# Patient Record
Sex: MALE | Race: WHITE | NOT HISPANIC OR LATINO
[De-identification: names, ages, dates, MRNs, and addresses within clinical notes are randomized per-mention and may not be internally consistent; named-entity substitution may affect disease eponyms.]

---

## 2018-07-26 ENCOUNTER — FORM ENCOUNTER (OUTPATIENT)
Age: 68
End: 2018-07-26

## 2019-04-12 ENCOUNTER — FORM ENCOUNTER (OUTPATIENT)
Age: 69
End: 2019-04-12

## 2022-08-04 DIAGNOSIS — I10 ESSENTIAL (PRIMARY) HYPERTENSION: ICD-10-CM

## 2022-08-04 DIAGNOSIS — B35.1 TINEA UNGUIUM: ICD-10-CM

## 2022-08-04 DIAGNOSIS — Z82.61 FAMILY HISTORY OF ARTHRITIS: ICD-10-CM

## 2022-08-04 DIAGNOSIS — E78.5 HYPERLIPIDEMIA, UNSPECIFIED: ICD-10-CM

## 2022-08-04 DIAGNOSIS — Z82.49 FAMILY HISTORY OF ISCHEMIC HEART DISEASE AND OTHER DISEASES OF THE CIRCULATORY SYSTEM: ICD-10-CM

## 2022-08-04 DIAGNOSIS — Z98.890 OTHER SPECIFIED POSTPROCEDURAL STATES: ICD-10-CM

## 2022-08-04 DIAGNOSIS — Z87.891 PERSONAL HISTORY OF NICOTINE DEPENDENCE: ICD-10-CM

## 2022-08-04 DIAGNOSIS — L60.2 ONYCHOGRYPHOSIS: ICD-10-CM

## 2022-08-04 DIAGNOSIS — Z86.39 PERSONAL HISTORY OF OTHER ENDOCRINE, NUTRITIONAL AND METABOLIC DISEASE: ICD-10-CM

## 2022-08-04 PROBLEM — Z00.00 ENCOUNTER FOR PREVENTIVE HEALTH EXAMINATION: Status: ACTIVE | Noted: 2022-08-04

## 2022-08-04 RX ORDER — SIMVASTATIN 20 MG/1
20 TABLET, FILM COATED ORAL
Refills: 0 | Status: ACTIVE | COMMUNITY

## 2022-08-04 RX ORDER — AMLODIPINE BESYLATE 5 MG/1
5 TABLET ORAL
Refills: 0 | Status: ACTIVE | COMMUNITY

## 2022-08-04 RX ORDER — FUROSEMIDE 40 MG/1
40 TABLET ORAL
Refills: 0 | Status: ACTIVE | COMMUNITY

## 2022-08-04 RX ORDER — CARVEDILOL 6.25 MG/1
6.25 TABLET, FILM COATED ORAL
Refills: 0 | Status: ACTIVE | COMMUNITY

## 2022-08-04 RX ORDER — METFORMIN HYDROCHLORIDE 1000 MG/1
1000 TABLET, COATED ORAL
Refills: 0 | Status: ACTIVE | COMMUNITY

## 2022-08-22 ENCOUNTER — APPOINTMENT (OUTPATIENT)
Dept: PODIATRY | Facility: CLINIC | Age: 72
End: 2022-08-22

## 2022-08-26 ENCOUNTER — APPOINTMENT (OUTPATIENT)
Dept: PODIATRY | Facility: CLINIC | Age: 72
End: 2022-08-26

## 2022-08-26 PROCEDURE — 11055 PARING/CUTG B9 HYPRKER LES 1: CPT

## 2022-08-26 PROCEDURE — 11721 DEBRIDE NAIL 6 OR MORE: CPT | Mod: 59

## 2022-09-02 NOTE — HISTORY OF PRESENT ILLNESS
[Sneakers] : fidelina [FreeTextEntry1] : Patient presents today for at-risk foot care.  He has painful, thickened nails that he cannot cut.  He also has bilateral, medial and lateral ingrowning sides of his halluces.  He has painful hyperkeratotic lesion, plantar medial side of the hallux as well.  Patient is wide Skechers with adequate padding.  \par Patient is diabetic.  He follows with Dr. Alvarado, who he last saw June 2022.  Last A1c: 7%.  Finger stick: not checked this morning.  Patient denies any tingling, burning or numbness to the lower extremities.  \par

## 2022-09-02 NOTE — PHYSICAL EXAM
[Ankle Swelling (On Exam)] : present [Ankle Swelling Bilaterally] : bilaterally  [Ankle Swelling On The Left] : moderate [0] : left foot posterior tibialis 0 [1+] : left foot dorsalis pedis 1+ [Vibration Dec.] : diminished vibratory sensation at the level of the toes [Position Sense Dec.] : diminished position sense at the level of the toes [Diminished Throughout Right Foot] : diminished sensation with monofilament testing throughout right foot [Diminished Throughout Left Foot] : diminished sensation with monofilament testing throughout left foot [FreeTextEntry3] : CFT: Immediate.  Temperature gradient: normal.  Patient has fluid retention.   [de-identified] : Flexible hammertoes 2 through 5, bilateral.  All pedal joints ROM: normal.  Muscle strength: normal to all pedal muscles, bilateral.   [FreeTextEntry1] : Q9-The patient has a class finding of Q9-One Class B and 2 Class C findings

## 2022-09-02 NOTE — PROCEDURE
[FreeTextEntry1] : Impression: Pain, right foot, pain left foot (M79).  Diabetes with neuropathy (E11.42).  Ingrown toenails (L60) Nail dystrophy (L60.3) \par \par Treatment: Educated patient on the importance of glycemic control and diabetic foot care.  \par Toenails 1 through 5 bilaterally were debrided in thickness and in length with sterile nippers and rotary bur to patients’ satisfaction. Patient tolerated the procedure well.  Slant backs were performed on both medial and lateral and distal halluces to relieve the ingrown toenails.  Checked for spicules, none were found.  \par Medial callus on the hallux was trimmed with sterile #23 blade to patients’ satisfaction. Patient tolerated the procedure well. \par Return: 64 days.

## 2022-10-31 ENCOUNTER — APPOINTMENT (OUTPATIENT)
Dept: PODIATRY | Facility: CLINIC | Age: 72
End: 2022-10-31

## 2022-10-31 VITALS — HEIGHT: 69 IN | WEIGHT: 200 LBS | BODY MASS INDEX: 29.62 KG/M2

## 2022-10-31 PROCEDURE — 11721 DEBRIDE NAIL 6 OR MORE: CPT | Mod: 59

## 2022-10-31 PROCEDURE — 11055 PARING/CUTG B9 HYPRKER LES 1: CPT

## 2022-11-07 NOTE — HISTORY OF PRESENT ILLNESS
[Sneakers] : fidelina [FreeTextEntry1] : Patient presents today for at-risk foot care.  He has painful, thickened nails that he cannot cut.  He also has IPK's.  Patient is diabetic.  A1c: 6.7%.  He did not check his finger stick this morning.  He follows with Dr. Alvarado, who he last saw a couple of weeks ago.  There are no changes to his medical conditions.  \par

## 2022-11-07 NOTE — ASSESSMENT
[FreeTextEntry1] : Impression: Diabetes with neuropathy (E11.42). Nail dystrophy (L60.3).  IPK, right hallux (L85.1).  \par \par Treatment: Educated patient on the importance of glycemic control and diabetic foot care.  \par Toenails 1 through 5 bilaterally were debrided in thickness and in length with sterile nippers and rotary bur to patients’ satisfaction. Patient tolerated the procedure well.  Slant backs were performed on both medial and lateral and distal halluces to relieve the ingrown toenails.  Checked for spicules, none were found.  \par Medial callus on the hallux was trimmed with sterile #23 blade to patients’ satisfaction. Patient tolerated the procedure well. \par Return: 64 days.

## 2022-11-07 NOTE — PHYSICAL EXAM
[Ankle Swelling (On Exam)] : present [Ankle Swelling Bilaterally] : bilaterally  [Ankle Swelling On The Left] : moderate [0] : left foot posterior tibialis 0 [1+] : left foot dorsalis pedis 1+ [Vibration Dec.] : diminished vibratory sensation at the level of the toes [Position Sense Dec.] : diminished position sense at the level of the toes [Diminished Throughout Right Foot] : diminished sensation with monofilament testing throughout right foot [Diminished Throughout Left Foot] : diminished sensation with monofilament testing throughout left foot [FreeTextEntry3] : CFT: Immediate.  Temperature gradient: normal.  Patient has fluid retention.   [de-identified] : Flexible hammertoes 2 through 5, bilateral.  All pedal joints ROM: normal.  Muscle strength: normal to all pedal muscles, bilateral.   [FreeTextEntry1] : Q9-The patient has a class finding of Q9-One Class B and 2 Class C findings

## 2023-01-10 ENCOUNTER — APPOINTMENT (OUTPATIENT)
Dept: PODIATRY | Facility: CLINIC | Age: 73
End: 2023-01-10
Payer: MEDICARE

## 2023-01-10 PROCEDURE — 11721 DEBRIDE NAIL 6 OR MORE: CPT | Mod: 59

## 2023-01-10 PROCEDURE — 11055 PARING/CUTG B9 HYPRKER LES 1: CPT

## 2023-01-23 NOTE — PHYSICAL EXAM
[Ankle Swelling (On Exam)] : present [Ankle Swelling Bilaterally] : bilaterally  [Ankle Swelling On The Left] : moderate [0] : left foot posterior tibialis 0 [1+] : left foot dorsalis pedis 1+ [Vibration Dec.] : diminished vibratory sensation at the level of the toes [Position Sense Dec.] : diminished position sense at the level of the toes [Diminished Throughout Right Foot] : diminished sensation with monofilament testing throughout right foot [Diminished Throughout Left Foot] : diminished sensation with monofilament testing throughout left foot [FreeTextEntry3] : CFT: Immediate. Temperature gradient: normal. Patient has fluid retention.  [de-identified] : Flexible hammertoes 2 through 5, bilateral. All pedal joints ROM: normal. Muscle strength: normal to all pedal muscles, bilateral. \par  [FreeTextEntry1] : The patient has a class finding of Q9-One Class B and 2 Class C findings.

## 2023-01-23 NOTE — HISTORY OF PRESENT ILLNESS
[Sneakers] : fidelina [FreeTextEntry1] : Patient returns today for management of painful onychomycotic ingrown toenails and right foot IPK. Patient is diabetic. Finger stick today is 112. A1c is 6.7. He last saw Dr. Alvarado 3 months ago.\par

## 2023-01-23 NOTE — ASSESSMENT
[FreeTextEntry1] : \par Impression: Diabetes with neuropathy. Nail dystrophy. IPK, right hallux.\par \par Treatment: Discussed importance of glycemic control and diabetic foot care.  Patient is wearing adequately fitting sneakers today. Patient's nails 1 through 5 bilaterally of excessive thickness and length to appropriate levels of comfort and contour using sterile nippers and Dremel.   The procedure was tolerated well without complications.  \par Failure to perform this treatment based on patient's underlying condition could lead to ulceration and infection. Slant backs were performed on both medial and lateral and distal halluces to relieve the ingrown toenails. Checked for spicules, none were found.  Right foot IPK was trimmed and reduced uneventfully with sterile #23 blade.  I will see him back in 64 days.\par \par \par

## 2023-03-21 ENCOUNTER — APPOINTMENT (OUTPATIENT)
Dept: PODIATRY | Facility: CLINIC | Age: 73
End: 2023-03-21
Payer: MEDICARE

## 2023-03-21 DIAGNOSIS — E53.8 DEFICIENCY OF OTHER SPECIFIED B GROUP VITAMINS: ICD-10-CM

## 2023-03-21 PROCEDURE — 11721 DEBRIDE NAIL 6 OR MORE: CPT | Mod: 59

## 2023-03-21 PROCEDURE — 11055 PARING/CUTG B9 HYPRKER LES 1: CPT

## 2023-03-24 PROBLEM — E53.8 B12 DEFICIENCY: Status: ACTIVE | Noted: 2023-03-21

## 2023-03-24 PROBLEM — E53.8 B12 DEFICIENCY: Status: RESOLVED | Noted: 2023-03-22 | Resolved: 2023-03-24

## 2023-03-24 RX ORDER — DULAGLUTIDE 0.75 MG/.5ML
0.75 INJECTION, SOLUTION SUBCUTANEOUS
Qty: 2 | Refills: 0 | Status: ACTIVE | COMMUNITY
Start: 2023-02-27

## 2023-03-24 RX ORDER — ROSUVASTATIN CALCIUM 20 MG/1
20 TABLET, FILM COATED ORAL
Qty: 90 | Refills: 0 | Status: ACTIVE | COMMUNITY
Start: 2022-04-21

## 2023-03-24 RX ORDER — FINASTERIDE 5 MG/1
5 TABLET, FILM COATED ORAL
Qty: 90 | Refills: 0 | Status: ACTIVE | COMMUNITY
Start: 2022-05-09

## 2023-03-24 RX ORDER — CARVEDILOL 25 MG/1
25 TABLET, FILM COATED ORAL
Qty: 180 | Refills: 0 | Status: ACTIVE | COMMUNITY
Start: 2022-06-20

## 2023-03-24 RX ORDER — COVID-19 ANTIGEN TEST
KIT MISCELLANEOUS
Qty: 8 | Refills: 0 | Status: ACTIVE | COMMUNITY
Start: 2023-02-19

## 2023-03-24 RX ORDER — MECOBALAMIN 5000 MCG
LOZENGE ORAL
Refills: 0 | Status: ACTIVE | COMMUNITY

## 2023-03-24 NOTE — HISTORY OF PRESENT ILLNESS
[Sneakers] : fidelina [FreeTextEntry1] : Patient returns today for management of painful thickened toenails with ingrown hallux nails as well as painful IPK.  Patient is diabetic but did not check his finger stick this morning.  His last A1c was 6.7%.  His last primary care doctor appointment was one month ago.  Patient was diagnosed with B-12 deficiency and has been going for B12 shots by his primary care doctor.

## 2023-03-24 NOTE — ASSESSMENT
[FreeTextEntry1] : \par Impression: Diabetes with neuropathy (E11.42).  Nail dystrophy (L60.3). IPK, right hallux (L85.1).\par \par Treatment: Discussed importance of glycemic control and diabetic foot care.  Patient is wearing adequately fitting sneakers today. \par Nails 1 through 5 bilaterally were debrided of excessive thickness and length to appropriate levels of comfort and contour using sterile nippers and Dremel.   The procedure was tolerated well without complications.\par The IPK was  trimmed and reduced uneventfully with sterile #23 blade.  \par Failure to perform this treatment based on patient's underlying condition could lead to ulceration and infection.\par Return: 64 days

## 2023-03-24 NOTE — PHYSICAL EXAM
[Ankle Swelling (On Exam)] : present [Ankle Swelling Bilaterally] : bilaterally  [Ankle Swelling On The Left] : moderate [0] : left foot posterior tibialis 0 [1+] : left foot dorsalis pedis 1+ [Vibration Dec.] : diminished vibratory sensation at the level of the toes [Position Sense Dec.] : diminished position sense at the level of the toes [Diminished Throughout Right Foot] : diminished sensation with monofilament testing throughout right foot [Diminished Throughout Left Foot] : diminished sensation with monofilament testing throughout left foot [FreeTextEntry3] : CFT: 3 seconds x 10.  Temperature gradient: normal.  Patient has some fluid retention.   [de-identified] : Flexible hammertoes 2 through 5, bilateral.  All pedal joints ROM: normal.  Muscle strength: normal to all pedal muscles, bilateral. \par  [FreeTextEntry1] : Q9-The patient has a class finding of Q9-One Class B and 2 Class C findings

## 2023-06-05 ENCOUNTER — APPOINTMENT (OUTPATIENT)
Dept: PODIATRY | Facility: CLINIC | Age: 73
End: 2023-06-05
Payer: MEDICARE

## 2023-06-05 PROCEDURE — 11056 PARNG/CUTG B9 HYPRKR LES 2-4: CPT

## 2023-06-05 PROCEDURE — 11721 DEBRIDE NAIL 6 OR MORE: CPT | Mod: 59

## 2023-06-07 NOTE — HISTORY OF PRESENT ILLNESS
[FreeTextEntry1] : Patient returns today for management of painful elongated toenails that become ingrown.  Patient is diabetic.  He did not check his finger stick today.  His last A1c was 6.4%.  His last PCP appointment was 2 months ago.

## 2023-06-07 NOTE — PHYSICAL EXAM
[Ankle Swelling (On Exam)] : present [Ankle Swelling Bilaterally] : bilaterally  [Ankle Swelling On The Left] : moderate [0] : left foot posterior tibialis 0 [1+] : left foot dorsalis pedis 1+ [Vibration Dec.] : diminished vibratory sensation at the level of the toes [Position Sense Dec.] : diminished position sense at the level of the toes [Diminished Throughout Right Foot] : diminished sensation with monofilament testing throughout right foot [Diminished Throughout Left Foot] : diminished sensation with monofilament testing throughout left foot [FreeTextEntry3] : CFT: 3 seconds x 10.  Temperature gradient: normal.  Patient has some fluid retention.   [de-identified] : Flexible hammertoes 2 through 5, bilateral.  All pedal joints ROM: normal.  Muscle strength: normal to all pedal muscles, bilateral. \par  [FreeTextEntry1] : Q9-The patient has a class finding of Q9-One Class B and 2 Class C findings

## 2023-06-07 NOTE — ASSESSMENT
[FreeTextEntry1] : Impression: Diabetes with neuropathy (E11.42).  Nail dystrophy (L60.3). IPK, right hallux (L85.1).\par \par Treatment: Discussed importance of glycemic control and diabetic foot care.  Patient is wearing adequately fitting sneakers today. \par Nails 1 through 5 bilaterally were debrided of excessive thickness and length to appropriate levels of comfort and contour using sterile nippers and Dremel.   The procedure was tolerated well without complications.\par The IPK was  trimmed and reduced uneventfully with sterile #23 blade.  \par Failure to perform this treatment based on patient's underlying condition could lead to ulceration and infection.\par Return: 64 days

## 2023-08-14 ENCOUNTER — APPOINTMENT (OUTPATIENT)
Dept: PODIATRY | Facility: CLINIC | Age: 73
End: 2023-08-14
Payer: MEDICARE

## 2023-08-14 DIAGNOSIS — E11.40 TYPE 2 DIABETES MELLITUS WITH DIABETIC NEUROPATHY, UNSPECIFIED: ICD-10-CM

## 2023-08-14 DIAGNOSIS — L60.0 INGROWING NAIL: ICD-10-CM

## 2023-08-14 PROCEDURE — 11056 PARNG/CUTG B9 HYPRKR LES 2-4: CPT

## 2023-08-14 PROCEDURE — 11721 DEBRIDE NAIL 6 OR MORE: CPT | Mod: 59

## 2023-08-15 PROBLEM — E11.40 DIABETES MELLITUS WITH NEUROPATHY: Status: ACTIVE | Noted: 2022-08-04

## 2023-08-21 PROBLEM — L60.0 INGROWN NAIL: Status: ACTIVE | Noted: 2023-08-15

## 2023-08-21 NOTE — HISTORY OF PRESENT ILLNESS
[FreeTextEntry1] : Patient returns today for management of painful, thickened, elongated toenails that become ingrown and hyperkeratotic skin lesions.  Patient is diabetic.  He did not check his finger stick today.  His last A1c was 6.6%.  His last PCP appointment was in July and reports no new medical changes.

## 2023-08-21 NOTE — PHYSICAL EXAM
[Ankle Swelling (On Exam)] : present [Ankle Swelling Bilaterally] : bilaterally  [Ankle Swelling On The Left] : moderate [0] : left foot posterior tibialis 0 [1+] : left foot dorsalis pedis 1+ [Vibration Dec.] : diminished vibratory sensation at the level of the toes [Position Sense Dec.] : diminished position sense at the level of the toes [Diminished Throughout Right Foot] : diminished sensation with monofilament testing throughout right foot [Diminished Throughout Left Foot] : diminished sensation with monofilament testing throughout left foot [FreeTextEntry3] : CFT: 3 seconds x 10.  Temperature gradient: normal.  Patient has some fluid retention.   [de-identified] : Flexible hammertoes 2 through 5, bilateral.  All pedal joints ROM: normal.  Muscle strength: normal to all pedal muscles, bilateral. \par   [FreeTextEntry1] : Q9-The patient has a class finding of Q9-One Class B and 2 Class C findings

## 2023-10-24 ENCOUNTER — APPOINTMENT (OUTPATIENT)
Dept: PODIATRY | Facility: CLINIC | Age: 73
End: 2023-10-24
Payer: MEDICARE

## 2023-10-24 PROCEDURE — 11721 DEBRIDE NAIL 6 OR MORE: CPT | Mod: 59

## 2023-10-24 PROCEDURE — 11056 PARNG/CUTG B9 HYPRKR LES 2-4: CPT

## 2024-01-02 ENCOUNTER — APPOINTMENT (OUTPATIENT)
Dept: PODIATRY | Facility: CLINIC | Age: 74
End: 2024-01-02
Payer: MEDICARE

## 2024-01-02 PROCEDURE — 11056 PARNG/CUTG B9 HYPRKR LES 2-4: CPT

## 2024-01-02 PROCEDURE — 11721 DEBRIDE NAIL 6 OR MORE: CPT | Mod: 59

## 2024-03-18 ENCOUNTER — APPOINTMENT (OUTPATIENT)
Dept: PODIATRY | Facility: CLINIC | Age: 74
End: 2024-03-18
Payer: MEDICARE

## 2024-03-18 DIAGNOSIS — L85.1 ACQUIRED KERATOSIS [KERATODERMA] PALMARIS ET PLANTARIS: ICD-10-CM

## 2024-03-18 PROCEDURE — 11719 TRIM NAIL(S) ANY NUMBER: CPT | Mod: Q9

## 2024-03-18 PROCEDURE — 11056 PARNG/CUTG B9 HYPRKR LES 2-4: CPT | Mod: Q9

## 2024-03-19 PROBLEM — L85.1 PLANTAR KERATOSIS, ACQUIRED: Status: ACTIVE | Noted: 2022-08-04

## 2024-03-20 NOTE — ASSESSMENT
[FreeTextEntry1] : Impression: Keratosis. Dystrophy of the nails. Diabetic neuropathy. Pain.  Treatment: I prepped the area with alcohol and trimmed keratotic lesions x2 without incident. I debrided dystrophic and ingrown nails without incident. I put Neosporin dressing on. Patient will follow-up in the office with continued problems that persist. Patient was given home care instructions and education on care of the diabetic foot.

## 2024-03-20 NOTE — HISTORY OF PRESENT ILLNESS
[FreeTextEntry1] : Patient presents today for high-risk foot care. His A1c is 6.6. Fasting sugar is 114. He has dystrophic and ingrown nails as well as worsening keratosis. He complains of pain despite having neuropathy.

## 2024-03-20 NOTE — PHYSICAL EXAM
[Ankle Swelling (On Exam)] : present [Ankle Swelling Bilaterally] : bilaterally  [Ankle Swelling On The Left] : moderate [0] : left foot posterior tibialis 0 [1+] : left foot dorsalis pedis 1+ [Vibration Dec.] : diminished vibratory sensation at the level of the toes [Diminished Throughout Right Foot] : diminished sensation with monofilament testing throughout right foot [Position Sense Dec.] : diminished position sense at the level of the toes [Diminished Throughout Left Foot] : diminished sensation with monofilament testing throughout left foot [FreeTextEntry3] : CFT: 3 seconds x10. Temperature gradient: normal. Patient has fluid retention.  [de-identified] : Functional hallux limitus with pinched callus and semi-rigid hammertoes. [FreeTextEntry1] : Paresthesias. The patient has a class finding of Q9-One Class B and 2 Class C findings.

## 2024-05-21 ENCOUNTER — APPOINTMENT (OUTPATIENT)
Dept: PODIATRY | Facility: CLINIC | Age: 74
End: 2024-05-21
Payer: MEDICARE

## 2024-05-21 DIAGNOSIS — E11.42 TYPE 2 DIABETES MELLITUS WITH DIABETIC POLYNEUROPATHY: ICD-10-CM

## 2024-05-21 DIAGNOSIS — M79.676 PAIN IN UNSPECIFIED TOE(S): ICD-10-CM

## 2024-05-21 DIAGNOSIS — M21.40 FLAT FOOT [PES PLANUS] (ACQUIRED), UNSPECIFIED FOOT: ICD-10-CM

## 2024-05-21 DIAGNOSIS — L60.3 NAIL DYSTROPHY: ICD-10-CM

## 2024-05-21 DIAGNOSIS — L85.1 ACQUIRED KERATOSIS [KERATODERMA] PALMARIS ET PLANTARIS: ICD-10-CM

## 2024-05-21 PROCEDURE — 11719 TRIM NAIL(S) ANY NUMBER: CPT | Mod: Q9

## 2024-05-21 PROCEDURE — 11056 PARNG/CUTG B9 HYPRKR LES 2-4: CPT | Mod: Q9

## 2024-05-22 PROBLEM — E11.42 TYPE 2 DIABETES MELLITUS WITH DIABETIC POLYNEUROPATHY: Status: ACTIVE | Noted: 2024-01-03

## 2024-05-22 PROBLEM — M79.676 TOE PAIN: Status: ACTIVE | Noted: 2024-03-19

## 2024-05-22 PROBLEM — L60.3 DYSTROPHIC NAIL: Status: ACTIVE | Noted: 2022-08-31

## 2024-05-23 PROBLEM — L85.1 KERATODERMA, ACQUIRED: Status: ACTIVE | Noted: 2024-05-22

## 2024-05-23 PROBLEM — M21.40 FLATFOOT: Status: ACTIVE | Noted: 2024-05-22

## 2024-05-23 NOTE — ASSESSMENT
[FreeTextEntry1] : Impression: Keratosis. Diabetic neuropathy. Pain. Flatfoot. Nail dystrophy.  Treatment: I trimmed keratotic lesions x2 or 3 without incident. I debrided dystrophic and ingrown nails without incident. I discussed diabetic foot care and diabetic neuropathy. Shoes are noted to be orthopedic and adequate. I encouraged him to walk.

## 2024-05-23 NOTE — PHYSICAL EXAM
[Ankle Swelling (On Exam)] : present [Ankle Swelling Bilaterally] : bilaterally  [Ankle Swelling On The Left] : moderate [0] : left foot posterior tibialis 0 [1+] : left foot dorsalis pedis 1+ [Vibration Dec.] : diminished vibratory sensation at the level of the toes [Position Sense Dec.] : diminished position sense at the level of the toes [Diminished Throughout Right Foot] : diminished sensation with monofilament testing throughout right foot [Diminished Throughout Left Foot] : diminished sensation with monofilament testing throughout left foot [FreeTextEntry3] : CFT: 3 seconds x10. Temperature gradient: normal. Patient has fluid retention.  [de-identified] : Functional hallux limitus with pinched callus and semi-rigid hammertoes. [FreeTextEntry1] : Paresthesias. The patient has a class finding of Q9-One Class B and 2 Class C findings.

## 2024-05-23 NOTE — HISTORY OF PRESENT ILLNESS
[FreeTextEntry1] : Patient presents for high-risk foot care. Fasting sugar is 119. He has worsening keratosis at the great toes bilateral, as well as sub 1 keratosis. He has incurvated nails at the hallux that are non-infected.

## 2024-08-21 ENCOUNTER — APPOINTMENT (OUTPATIENT)
Dept: PODIATRY | Facility: CLINIC | Age: 74
End: 2024-08-21

## 2024-08-21 DIAGNOSIS — L85.1 ACQUIRED KERATOSIS [KERATODERMA] PALMARIS ET PLANTARIS: ICD-10-CM

## 2024-08-21 DIAGNOSIS — M79.676 PAIN IN UNSPECIFIED TOE(S): ICD-10-CM

## 2024-08-21 DIAGNOSIS — B35.1 TINEA UNGUIUM: ICD-10-CM

## 2024-08-21 DIAGNOSIS — E11.42 TYPE 2 DIABETES MELLITUS WITH DIABETIC POLYNEUROPATHY: ICD-10-CM

## 2024-08-21 PROCEDURE — 11719 TRIM NAIL(S) ANY NUMBER: CPT | Mod: Q9,59

## 2024-08-21 PROCEDURE — 11056 PARNG/CUTG B9 HYPRKR LES 2-4: CPT | Mod: Q9

## 2024-08-26 NOTE — PHYSICAL EXAM
[Ankle Swelling (On Exam)] : present [Ankle Swelling Bilaterally] : bilaterally  [Ankle Swelling On The Left] : moderate [0] : left foot posterior tibialis 0 [1+] : left foot dorsalis pedis 1+ [Vibration Dec.] : diminished vibratory sensation at the level of the toes [Position Sense Dec.] : diminished position sense at the level of the toes [Diminished Throughout Right Foot] : diminished sensation with monofilament testing throughout right foot [Diminished Throughout Left Foot] : diminished sensation with monofilament testing throughout left foot [FreeTextEntry3] : CFT: 3 seconds x10. Temperature gradient: normal. Patient has fluid retention.  [de-identified] : Functional hallux limitus with pinched callus and semi-rigid hammertoes. [FreeTextEntry1] : Paresthesias. The patient has a class finding of Q9-One Class B and 2 Class C findings.

## 2024-08-26 NOTE — ASSESSMENT
[FreeTextEntry1] : Impression: Keratosis. Diabetic neuropathy. Onychomycosis. Pain.  Treatment: I trimmed keratotic lesions x2, after prepping with alcohol, without incident. I manually and mechanically debrided hallux mycotic nails using a small straight nail splitter and rotary adalid. The nails were aggressively debrided and debulked to make them comfortable in shoe gear. Remainder of nails were trimmed to hygienic length. I discussed diabetic foot care and diabetic neuropathy. I discussed shoe gear and diabetic foot care and daily inspection of the foot. Follow-up in 3 months.
Yes

## 2024-08-26 NOTE — HISTORY OF PRESENT ILLNESS
[FreeTextEntry1] : Patient presents today for high-risk foot care. Fasting sugar is 114. He has worsening ingrown, dystrophic nails and worsening keratosis. He has a high risk foot due to neuropathy.  The patient's history and physical has been reviewed and verified with no changes.

## 2024-08-26 NOTE — PHYSICAL EXAM
[Ankle Swelling (On Exam)] : present [Ankle Swelling Bilaterally] : bilaterally  [Ankle Swelling On The Left] : moderate [0] : left foot posterior tibialis 0 [1+] : left foot dorsalis pedis 1+ [Vibration Dec.] : diminished vibratory sensation at the level of the toes [Position Sense Dec.] : diminished position sense at the level of the toes [Diminished Throughout Right Foot] : diminished sensation with monofilament testing throughout right foot [Diminished Throughout Left Foot] : diminished sensation with monofilament testing throughout left foot [FreeTextEntry3] : CFT: 3 seconds x10. Temperature gradient: normal. Patient has fluid retention.  [de-identified] : Functional hallux limitus with pinched callus and semi-rigid hammertoes. [FreeTextEntry1] : Paresthesias. The patient has a class finding of Q9-One Class B and 2 Class C findings.

## 2024-08-26 NOTE — PHYSICAL EXAM
[Ankle Swelling (On Exam)] : present [Ankle Swelling Bilaterally] : bilaterally  [Ankle Swelling On The Left] : moderate [0] : left foot posterior tibialis 0 [1+] : left foot dorsalis pedis 1+ [Vibration Dec.] : diminished vibratory sensation at the level of the toes [Position Sense Dec.] : diminished position sense at the level of the toes [Diminished Throughout Right Foot] : diminished sensation with monofilament testing throughout right foot [Diminished Throughout Left Foot] : diminished sensation with monofilament testing throughout left foot [FreeTextEntry3] : CFT: 3 seconds x10. Temperature gradient: normal. Patient has fluid retention.  [de-identified] : Functional hallux limitus with pinched callus and semi-rigid hammertoes. [FreeTextEntry1] : Paresthesias. The patient has a class finding of Q9-One Class B and 2 Class C findings.

## 2024-10-30 ENCOUNTER — APPOINTMENT (OUTPATIENT)
Dept: PODIATRY | Facility: CLINIC | Age: 74
End: 2024-10-30
Payer: MEDICARE

## 2024-10-30 DIAGNOSIS — M79.676 PAIN IN UNSPECIFIED TOE(S): ICD-10-CM

## 2024-10-30 DIAGNOSIS — E11.42 TYPE 2 DIABETES MELLITUS WITH DIABETIC POLYNEUROPATHY: ICD-10-CM

## 2024-10-30 DIAGNOSIS — L60.0 INGROWING NAIL: ICD-10-CM

## 2024-10-30 PROCEDURE — 99212 OFFICE O/P EST SF 10 MIN: CPT

## 2025-01-08 ENCOUNTER — APPOINTMENT (OUTPATIENT)
Dept: PODIATRY | Facility: CLINIC | Age: 75
End: 2025-01-08

## 2025-01-08 DIAGNOSIS — M79.676 PAIN IN UNSPECIFIED TOE(S): ICD-10-CM

## 2025-01-08 DIAGNOSIS — L60.0 INGROWING NAIL: ICD-10-CM

## 2025-01-08 DIAGNOSIS — E11.42 TYPE 2 DIABETES MELLITUS WITH DIABETIC POLYNEUROPATHY: ICD-10-CM

## 2025-01-08 PROCEDURE — 99212 OFFICE O/P EST SF 10 MIN: CPT

## 2025-03-19 ENCOUNTER — APPOINTMENT (OUTPATIENT)
Dept: PODIATRY | Facility: CLINIC | Age: 75
End: 2025-03-19

## 2025-03-19 DIAGNOSIS — M79.676 PAIN IN UNSPECIFIED TOE(S): ICD-10-CM

## 2025-03-19 DIAGNOSIS — E11.42 TYPE 2 DIABETES MELLITUS WITH DIABETIC POLYNEUROPATHY: ICD-10-CM

## 2025-03-19 DIAGNOSIS — L60.0 INGROWING NAIL: ICD-10-CM

## 2025-03-19 PROCEDURE — 99212 OFFICE O/P EST SF 10 MIN: CPT

## 2025-06-03 ENCOUNTER — NON-APPOINTMENT (OUTPATIENT)
Age: 75
End: 2025-06-03

## 2025-06-04 ENCOUNTER — APPOINTMENT (OUTPATIENT)
Dept: PODIATRY | Facility: CLINIC | Age: 75
End: 2025-06-04
Payer: MEDICARE

## 2025-06-04 DIAGNOSIS — E11.42 TYPE 2 DIABETES MELLITUS WITH DIABETIC POLYNEUROPATHY: ICD-10-CM

## 2025-06-04 DIAGNOSIS — M79.676 PAIN IN UNSPECIFIED TOE(S): ICD-10-CM

## 2025-06-04 DIAGNOSIS — L60.0 INGROWING NAIL: ICD-10-CM

## 2025-06-04 PROCEDURE — 99212 OFFICE O/P EST SF 10 MIN: CPT

## 2025-08-13 ENCOUNTER — APPOINTMENT (OUTPATIENT)
Dept: PODIATRY | Facility: CLINIC | Age: 75
End: 2025-08-13

## 2025-08-13 DIAGNOSIS — M79.676 PAIN IN UNSPECIFIED TOE(S): ICD-10-CM

## 2025-08-13 DIAGNOSIS — E11.40 TYPE 2 DIABETES MELLITUS WITH DIABETIC NEUROPATHY, UNSPECIFIED: ICD-10-CM

## 2025-08-13 DIAGNOSIS — L60.0 INGROWING NAIL: ICD-10-CM

## 2025-08-13 PROCEDURE — 99212 OFFICE O/P EST SF 10 MIN: CPT
